# Patient Record
Sex: FEMALE | Race: WHITE | URBAN - NONMETROPOLITAN AREA
[De-identification: names, ages, dates, MRNs, and addresses within clinical notes are randomized per-mention and may not be internally consistent; named-entity substitution may affect disease eponyms.]

---

## 2019-03-16 ENCOUNTER — APPOINTMENT (OUTPATIENT)
Dept: ULTRASOUND IMAGING | Facility: OTHER | Age: 69
DRG: 419 | End: 2019-03-16
Attending: EMERGENCY MEDICINE
Payer: COMMERCIAL

## 2019-03-16 ENCOUNTER — HOSPITAL ENCOUNTER (INPATIENT)
Facility: OTHER | Age: 69
LOS: 1 days | Discharge: HOME | DRG: 419 | End: 2019-03-17
Attending: EMERGENCY MEDICINE | Admitting: SURGERY
Payer: COMMERCIAL

## 2019-03-16 ENCOUNTER — TRANSFERRED RECORDS (OUTPATIENT)
Dept: HEALTH INFORMATION MANAGEMENT | Facility: OTHER | Age: 69
End: 2019-03-16

## 2019-03-16 DIAGNOSIS — I10 ESSENTIAL HYPERTENSION, BENIGN: ICD-10-CM

## 2019-03-16 DIAGNOSIS — I48.20 CHRONIC ATRIAL FIBRILLATION (H): ICD-10-CM

## 2019-03-16 DIAGNOSIS — I48.91 ATRIAL FIBRILLATION, UNSPECIFIED TYPE (H): ICD-10-CM

## 2019-03-16 DIAGNOSIS — K81.0 ACUTE CHOLECYSTITIS: Primary | ICD-10-CM

## 2019-03-16 DIAGNOSIS — Z90.710 H/O: HYSTERECTOMY: ICD-10-CM

## 2019-03-16 DIAGNOSIS — K81.9 CHOLECYSTITIS: ICD-10-CM

## 2019-03-16 PROCEDURE — 99285 EMERGENCY DEPT VISIT HI MDM: CPT | Mod: 25 | Performed by: EMERGENCY MEDICINE

## 2019-03-16 PROCEDURE — 99223 1ST HOSP IP/OBS HIGH 75: CPT | Mod: AI | Performed by: SURGERY

## 2019-03-16 PROCEDURE — 25000132 ZZH RX MED GY IP 250 OP 250 PS 637: Performed by: SURGERY

## 2019-03-16 PROCEDURE — 25000128 H RX IP 250 OP 636: Performed by: EMERGENCY MEDICINE

## 2019-03-16 PROCEDURE — 96365 THER/PROPH/DIAG IV INF INIT: CPT | Performed by: EMERGENCY MEDICINE

## 2019-03-16 PROCEDURE — 99285 EMERGENCY DEPT VISIT HI MDM: CPT | Mod: Z6 | Performed by: EMERGENCY MEDICINE

## 2019-03-16 PROCEDURE — 25000128 H RX IP 250 OP 636: Performed by: SURGERY

## 2019-03-16 PROCEDURE — 76705 ECHO EXAM OF ABDOMEN: CPT

## 2019-03-16 PROCEDURE — 12000000 ZZH R&B MED SURG/OB

## 2019-03-16 RX ORDER — PROCHLORPERAZINE MALEATE 5 MG
5 TABLET ORAL EVERY 6 HOURS PRN
Status: DISCONTINUED | OUTPATIENT
Start: 2019-03-16 | End: 2019-03-17 | Stop reason: HOSPADM

## 2019-03-16 RX ORDER — FAMOTIDINE 20 MG/1
20 TABLET, FILM COATED ORAL 2 TIMES DAILY
COMMUNITY
Start: 2016-02-12

## 2019-03-16 RX ORDER — ATENOLOL 25 MG/1
25 TABLET ORAL DAILY
Status: DISCONTINUED | OUTPATIENT
Start: 2019-03-17 | End: 2019-03-17 | Stop reason: HOSPADM

## 2019-03-16 RX ORDER — ACETAMINOPHEN 325 MG/1
975 TABLET ORAL ONCE
Status: DISCONTINUED | OUTPATIENT
Start: 2019-03-16 | End: 2019-03-17 | Stop reason: HOSPADM

## 2019-03-16 RX ORDER — ATENOLOL 25 MG/1
25 TABLET ORAL DAILY
COMMUNITY
Start: 2019-02-14

## 2019-03-16 RX ORDER — NALOXONE HYDROCHLORIDE 0.4 MG/ML
.1-.4 INJECTION, SOLUTION INTRAMUSCULAR; INTRAVENOUS; SUBCUTANEOUS
Status: DISCONTINUED | OUTPATIENT
Start: 2019-03-16 | End: 2019-03-17

## 2019-03-16 RX ORDER — DIPHENHYDRAMINE HYDROCHLORIDE 50 MG/ML
25 INJECTION INTRAMUSCULAR; INTRAVENOUS EVERY 6 HOURS PRN
Status: DISCONTINUED | OUTPATIENT
Start: 2019-03-16 | End: 2019-03-17 | Stop reason: HOSPADM

## 2019-03-16 RX ORDER — CLINDAMYCIN PHOSPHATE 900 MG/50ML
900 INJECTION, SOLUTION INTRAVENOUS SEE ADMIN INSTRUCTIONS
Status: DISCONTINUED | OUTPATIENT
Start: 2019-03-16 | End: 2019-03-17 | Stop reason: HOSPADM

## 2019-03-16 RX ORDER — PHYTONADIONE 5 MG/1
5 TABLET ORAL ONCE
Status: COMPLETED | OUTPATIENT
Start: 2019-03-16 | End: 2019-03-16

## 2019-03-16 RX ORDER — HYDROMORPHONE HYDROCHLORIDE 1 MG/ML
.3-.5 INJECTION, SOLUTION INTRAMUSCULAR; INTRAVENOUS; SUBCUTANEOUS
Status: DISCONTINUED | OUTPATIENT
Start: 2019-03-16 | End: 2019-03-17 | Stop reason: HOSPADM

## 2019-03-16 RX ORDER — KETOROLAC TROMETHAMINE 15 MG/ML
15 INJECTION, SOLUTION INTRAMUSCULAR; INTRAVENOUS ONCE
Status: DISCONTINUED | OUTPATIENT
Start: 2019-03-16 | End: 2019-03-17 | Stop reason: HOSPADM

## 2019-03-16 RX ORDER — CLINDAMYCIN PHOSPHATE 900 MG/50ML
900 INJECTION, SOLUTION INTRAVENOUS
Status: DISCONTINUED | OUTPATIENT
Start: 2019-03-16 | End: 2019-03-17 | Stop reason: HOSPADM

## 2019-03-16 RX ORDER — CIPROFLOXACIN 2 MG/ML
400 INJECTION, SOLUTION INTRAVENOUS EVERY 12 HOURS
Status: DISCONTINUED | OUTPATIENT
Start: 2019-03-16 | End: 2019-03-17

## 2019-03-16 RX ORDER — PROCHLORPERAZINE 25 MG
12.5 SUPPOSITORY, RECTAL RECTAL EVERY 12 HOURS PRN
Status: DISCONTINUED | OUTPATIENT
Start: 2019-03-16 | End: 2019-03-17 | Stop reason: HOSPADM

## 2019-03-16 RX ORDER — SODIUM CHLORIDE 9 MG/ML
INJECTION, SOLUTION INTRAVENOUS CONTINUOUS
Status: DISCONTINUED | OUTPATIENT
Start: 2019-03-17 | End: 2019-03-17 | Stop reason: HOSPADM

## 2019-03-16 RX ADMIN — PHYTONADIONE 5 MG: 5 TABLET ORAL at 22:29

## 2019-03-16 RX ADMIN — CIPROFLOXACIN 400 MG: 2 INJECTION, SOLUTION INTRAVENOUS at 22:26

## 2019-03-16 RX ADMIN — METRONIDAZOLE 500 MG: 500 INJECTION, SOLUTION INTRAVENOUS at 19:25

## 2019-03-16 ASSESSMENT — ACTIVITIES OF DAILY LIVING (ADL)
TOILETING: 0-->INDEPENDENT
FALL_HISTORY_WITHIN_LAST_SIX_MONTHS: NO
SWALLOWING: 0-->SWALLOWS FOODS/LIQUIDS WITHOUT DIFFICULTY
RETIRED_COMMUNICATION: 0-->UNDERSTANDS/COMMUNICATES WITHOUT DIFFICULTY
COGNITION: 0 - NO COGNITION ISSUES REPORTED
DRESS: 0-->INDEPENDENT
BATHING: 0-->INDEPENDENT
TRANSFERRING: 0-->INDEPENDENT
RETIRED_EATING: 0-->INDEPENDENT
AMBULATION: 0-->INDEPENDENT

## 2019-03-16 ASSESSMENT — MIFFLIN-ST. JEOR
SCORE: 1187.21
SCORE: 1156.13

## 2019-03-16 NOTE — ED TRIAGE NOTES
Patient started experiencing abdominal pain around 1200 today. Patient brought into ER in [place],was diagnosed with gallbladder issues, and was sent to [facility] for further testing they cant offer at [facility] on the weekend. Patient arrived with 18g IV infusing normal saline. Patient got 0.5 Dilaudid IV and Cipro from [place] ER before transfer. Patient A/O, and resting comfortably in room. md in room to see patient. Report from Brandee Napier RN at .

## 2019-03-16 NOTE — ED PROVIDER NOTES
"Lauryn Raines  : 1950 Age: 69 year old Sex: female MRN: [id number]    CC: abdominal pain    HPI: Lauryn Raines is a 69 year old female with PMH significant for hypertension, atrial fibrillation, partial gastric outlet obstruction, hysterectomy who presents as a transfer from [place] with concern for acute cholecystitis.  She reports that the abdominal pain that started suddenly around 1 PM today is 10 out of 10, epigastric, associated with nausea and vomiting and comes in waves.  At the outside hospital he obtained a CT due to concern for potential adhesions and SBO and CT was concerning for cholecystitis.  She was transferred here because they do not have ultrasound nor a surgeon on call.  She endorses ongoing pain during the transfer here, states that her pain is currently well controlled.    ED Course and MDM:  Abdominal pain: In the right upper quadrant, labs already obtained from the outside hospital with essentially normal LFTs, very mildly elevated lipase, INR of 2.2, normal white blood cell count, lactate of 2.1.  I performed a bedside ultrasound which was concerning for a thickened gallbladder wall and some pericholecystic fluid as well as sludge in the gallbladder.  Based on this I ordered a formal ultrasound which was consistent with a diagnosis of cholecystitis.  She will be admitted to surgery with plans for cholecystectomy in the morning.  She is already received ciprofloxacin at the outside hospital, I will give her Flagyl here.  She will be n.p.o. at midnight.    Final Clinical Impression:  Cholecystitis    Aidan Stoner MD  Internal Medicine and Emergency Medicine  6:15 PM 19      Physical Exam:  /86   Pulse 66   Temp 97.6  F (36.4  C) (Tympanic)   Resp 16   Ht 1.6 m (5' 3\")   Wt 70.3 kg (155 lb)   BMI 27.46 kg/m      Gen: Awake, alert, currently comfortable  HEENT: MMM, EOMI  CV: Irregular no murmur  Pulm: Clear bilaterally  Abd: Soft, tender in RUQ with " positive murphys  Ext: Full ROM  Neuro: AOx3, no focal deficit  Psych: Appropriate affect, cognition intact    ROS:  10 point review of systems performed and negative except as noted in HPI.    Past Medical History:  HTN, afib    Family history:  History reviewed. No pertinent family history.    Social History:   Social History     Socioeconomic History     Marital status:      Spouse name: Not on file     Number of children: Not on file     Years of education: Not on file     Highest education level: Not on file   Occupational History     Not on file   Social Needs     Financial resource strain: Not on file     Food insecurity:     Worry: Not on file     Inability: Not on file     Transportation needs:     Medical: Not on file     Non-medical: Not on file   Tobacco Use     Smoking status: Never Smoker     Smokeless tobacco: Never Used   Substance and Sexual Activity     Alcohol use: Not on file     Drug use: Not on file     Sexual activity: Not on file   Lifestyle     Physical activity:     Days per week: Not on file     Minutes per session: Not on file     Stress: Not on file   Relationships     Social connections:     Talks on phone: Not on file     Gets together: Not on file     Attends Taoist service: Not on file     Active member of club or organization: Not on file     Attends meetings of clubs or organizations: Not on file     Relationship status: Not on file     Intimate partner violence:     Fear of current or ex partner: Not on file     Emotionally abused: Not on file     Physically abused: Not on file     Forced sexual activity: Not on file   Other Topics Concern     Not on file   Social History Narrative     Not on file         Surgical History:  hysterrectomy  Bladder prolapse repair             Aidan Stoner MD  03/16/19 1914

## 2019-03-17 ENCOUNTER — ANESTHESIA EVENT (OUTPATIENT)
Dept: SURGERY | Facility: OTHER | Age: 69
DRG: 419 | End: 2019-03-17
Payer: COMMERCIAL

## 2019-03-17 ENCOUNTER — ANESTHESIA (OUTPATIENT)
Dept: SURGERY | Facility: OTHER | Age: 69
DRG: 419 | End: 2019-03-17
Payer: COMMERCIAL

## 2019-03-17 VITALS
HEART RATE: 77 BPM | OXYGEN SATURATION: 92 % | HEIGHT: 63 IN | SYSTOLIC BLOOD PRESSURE: 131 MMHG | BODY MASS INDEX: 26.25 KG/M2 | RESPIRATION RATE: 16 BRPM | TEMPERATURE: 97.8 F | DIASTOLIC BLOOD PRESSURE: 78 MMHG | WEIGHT: 148.15 LBS

## 2019-03-17 PROBLEM — K81.0 ACUTE CHOLECYSTITIS: Status: ACTIVE | Noted: 2019-03-17

## 2019-03-17 LAB
ABO + RH BLD: NORMAL
ABO + RH BLD: NORMAL
ALBUMIN SERPL-MCNC: 3.2 G/DL (ref 3.5–5.7)
ALP SERPL-CCNC: 65 U/L (ref 34–104)
ALT SERPL W P-5'-P-CCNC: 11 U/L (ref 7–52)
ANION GAP SERPL CALCULATED.3IONS-SCNC: 7 MMOL/L (ref 3–14)
AST SERPL W P-5'-P-CCNC: 16 U/L (ref 13–39)
BILIRUB SERPL-MCNC: 0.7 MG/DL (ref 0.3–1)
BLD GP AB SCN SERPL QL: NORMAL
BLD PROD TYP BPU: NORMAL
BLD UNIT ID BPU: NORMAL
BLD UNIT ID BPU: NORMAL
BLOOD BANK CMNT PATIENT-IMP: NORMAL
BLOOD PRODUCT CODE: NORMAL
BLOOD PRODUCT CODE: NORMAL
BPU ID: NORMAL
BPU ID: NORMAL
BUN SERPL-MCNC: 10 MG/DL (ref 7–25)
CALCIUM SERPL-MCNC: 8.7 MG/DL (ref 8.6–10.3)
CHLORIDE SERPL-SCNC: 112 MMOL/L (ref 98–107)
CO2 SERPL-SCNC: 25 MMOL/L (ref 21–31)
CREAT SERPL-MCNC: 0.81 MG/DL (ref 0.6–1.2)
ERYTHROCYTE [DISTWIDTH] IN BLOOD BY AUTOMATED COUNT: 15.2 % (ref 10–15)
GFR SERPL CREATININE-BSD FRML MDRD: 70 ML/MIN/{1.73_M2}
GLUCOSE SERPL-MCNC: 95 MG/DL (ref 70–105)
HCT VFR BLD AUTO: 34 % (ref 35–47)
HGB BLD-MCNC: 10.8 G/DL (ref 11.7–15.7)
INR PPP: 1.54 (ref 0–1.3)
INR PPP: 2.12 (ref 0–1.3)
MCH RBC QN AUTO: 27.8 PG (ref 26.5–33)
MCHC RBC AUTO-ENTMCNC: 31.8 G/DL (ref 31.5–36.5)
MCV RBC AUTO: 88 FL (ref 78–100)
NUM BPU REQUESTED: 2
PLATELET # BLD AUTO: 136 10E9/L (ref 150–450)
POTASSIUM SERPL-SCNC: 3.5 MMOL/L (ref 3.5–5.1)
PROT SERPL-MCNC: 5.5 G/DL (ref 6.4–8.9)
RBC # BLD AUTO: 3.88 10E12/L (ref 3.8–5.2)
SODIUM SERPL-SCNC: 144 MMOL/L (ref 134–144)
SPECIMEN EXP DATE BLD: NORMAL
TRANSFUSION STATUS PATIENT QL: NORMAL
WBC # BLD AUTO: 6.3 10E9/L (ref 4–11)

## 2019-03-17 PROCEDURE — 86901 BLOOD TYPING SEROLOGIC RH(D): CPT | Performed by: SURGERY

## 2019-03-17 PROCEDURE — 25000125 ZZHC RX 250: Performed by: NURSE ANESTHETIST, CERTIFIED REGISTERED

## 2019-03-17 PROCEDURE — 25000564 ZZH DESFLURANE, EA 15 MIN: Performed by: SURGERY

## 2019-03-17 PROCEDURE — 30233K1 TRANSFUSION OF NONAUTOLOGOUS FROZEN PLASMA INTO PERIPHERAL VEIN, PERCUTANEOUS APPROACH: ICD-10-PCS | Performed by: SURGERY

## 2019-03-17 PROCEDURE — 25000132 ZZH RX MED GY IP 250 OP 250 PS 637: Performed by: SURGERY

## 2019-03-17 PROCEDURE — 47562 LAPAROSCOPIC CHOLECYSTECTOMY: CPT | Performed by: SURGERY

## 2019-03-17 PROCEDURE — 80053 COMPREHEN METABOLIC PANEL: CPT | Performed by: SURGERY

## 2019-03-17 PROCEDURE — 86850 RBC ANTIBODY SCREEN: CPT | Performed by: SURGERY

## 2019-03-17 PROCEDURE — 36000056 ZZH SURGERY LEVEL 3 1ST 30 MIN: Performed by: SURGERY

## 2019-03-17 PROCEDURE — 25000128 H RX IP 250 OP 636: Performed by: SURGERY

## 2019-03-17 PROCEDURE — 27210794 ZZH OR GENERAL SUPPLY STERILE: Performed by: SURGERY

## 2019-03-17 PROCEDURE — 99100 ANES PT EXTEME AGE<1 YR&>70: CPT | Performed by: NURSE ANESTHETIST, CERTIFIED REGISTERED

## 2019-03-17 PROCEDURE — 71000014 ZZH RECOVERY PHASE 1 LEVEL 2 FIRST HR: Performed by: SURGERY

## 2019-03-17 PROCEDURE — 37000008 ZZH ANESTHESIA TECHNICAL FEE, 1ST 30 MIN: Performed by: SURGERY

## 2019-03-17 PROCEDURE — 99140 ANES COMP EMERGENCY COND: CPT | Performed by: NURSE ANESTHETIST, CERTIFIED REGISTERED

## 2019-03-17 PROCEDURE — 85027 COMPLETE CBC AUTOMATED: CPT | Performed by: SURGERY

## 2019-03-17 PROCEDURE — 25000128 H RX IP 250 OP 636: Performed by: NURSE ANESTHETIST, CERTIFIED REGISTERED

## 2019-03-17 PROCEDURE — 85610 PROTHROMBIN TIME: CPT | Performed by: SURGERY

## 2019-03-17 PROCEDURE — 25000125 ZZHC RX 250: Performed by: SURGERY

## 2019-03-17 PROCEDURE — 25800030 ZZH RX IP 258 OP 636: Performed by: SURGERY

## 2019-03-17 PROCEDURE — 25800025 ZZH RX 258: Performed by: SURGERY

## 2019-03-17 PROCEDURE — P9059 PLASMA, FRZ BETWEEN 8-24HOUR: HCPCS | Performed by: SURGERY

## 2019-03-17 PROCEDURE — 25800030 ZZH RX IP 258 OP 636: Performed by: NURSE ANESTHETIST, CERTIFIED REGISTERED

## 2019-03-17 PROCEDURE — 0FT44ZZ RESECTION OF GALLBLADDER, PERCUTANEOUS ENDOSCOPIC APPROACH: ICD-10-PCS | Performed by: SURGERY

## 2019-03-17 PROCEDURE — 86900 BLOOD TYPING SEROLOGIC ABO: CPT | Performed by: SURGERY

## 2019-03-17 PROCEDURE — 37000009 ZZH ANESTHESIA TECHNICAL FEE, EACH ADDTL 15 MIN: Performed by: SURGERY

## 2019-03-17 PROCEDURE — 88304 TISSUE EXAM BY PATHOLOGIST: CPT

## 2019-03-17 PROCEDURE — 36415 COLL VENOUS BLD VENIPUNCTURE: CPT | Performed by: SURGERY

## 2019-03-17 PROCEDURE — 36000058 ZZH SURGERY LEVEL 3 EA 15 ADDTL MIN: Performed by: SURGERY

## 2019-03-17 PROCEDURE — 40000344 ZZHCL STATISTIC THAWING COMPONENT: Performed by: SURGERY

## 2019-03-17 PROCEDURE — 47562 LAPAROSCOPIC CHOLECYSTECTOMY: CPT | Performed by: NURSE ANESTHETIST, CERTIFIED REGISTERED

## 2019-03-17 RX ORDER — DEXAMETHASONE SODIUM PHOSPHATE 4 MG/ML
INJECTION, SOLUTION INTRA-ARTICULAR; INTRALESIONAL; INTRAMUSCULAR; INTRAVENOUS; SOFT TISSUE PRN
Status: DISCONTINUED | OUTPATIENT
Start: 2019-03-17 | End: 2019-03-17

## 2019-03-17 RX ORDER — LIDOCAINE 40 MG/G
CREAM TOPICAL
Status: DISCONTINUED | OUTPATIENT
Start: 2019-03-17 | End: 2019-03-17 | Stop reason: HOSPADM

## 2019-03-17 RX ORDER — SODIUM CHLORIDE, SODIUM LACTATE, POTASSIUM CHLORIDE, CALCIUM CHLORIDE 600; 310; 30; 20 MG/100ML; MG/100ML; MG/100ML; MG/100ML
INJECTION, SOLUTION INTRAVENOUS CONTINUOUS
Status: DISCONTINUED | OUTPATIENT
Start: 2019-03-17 | End: 2019-03-17 | Stop reason: HOSPADM

## 2019-03-17 RX ORDER — PROPOFOL 10 MG/ML
INJECTION, EMULSION INTRAVENOUS CONTINUOUS PRN
Status: DISCONTINUED | OUTPATIENT
Start: 2019-03-17 | End: 2019-03-17

## 2019-03-17 RX ORDER — SACCHAROMYCES BOULARDII 250 MG
250 CAPSULE ORAL DAILY
COMMUNITY

## 2019-03-17 RX ORDER — NEOSTIGMINE METHYLSULFATE 1 MG/ML
VIAL (ML) INJECTION PRN
Status: DISCONTINUED | OUTPATIENT
Start: 2019-03-17 | End: 2019-03-17

## 2019-03-17 RX ORDER — FENTANYL CITRATE 50 UG/ML
INJECTION, SOLUTION INTRAMUSCULAR; INTRAVENOUS PRN
Status: DISCONTINUED | OUTPATIENT
Start: 2019-03-17 | End: 2019-03-17

## 2019-03-17 RX ORDER — MULTIVIT-MIN/IRON/FOLIC ACID/K 18-600-40
1 CAPSULE ORAL DAILY
COMMUNITY

## 2019-03-17 RX ORDER — PROPOFOL 10 MG/ML
INJECTION, EMULSION INTRAVENOUS PRN
Status: DISCONTINUED | OUTPATIENT
Start: 2019-03-17 | End: 2019-03-17

## 2019-03-17 RX ORDER — GLYCOPYRROLATE 0.2 MG/ML
INJECTION, SOLUTION INTRAMUSCULAR; INTRAVENOUS PRN
Status: DISCONTINUED | OUTPATIENT
Start: 2019-03-17 | End: 2019-03-17

## 2019-03-17 RX ORDER — NALOXONE HYDROCHLORIDE 0.4 MG/ML
.1-.4 INJECTION, SOLUTION INTRAMUSCULAR; INTRAVENOUS; SUBCUTANEOUS
Status: DISCONTINUED | OUTPATIENT
Start: 2019-03-17 | End: 2019-03-17 | Stop reason: HOSPADM

## 2019-03-17 RX ORDER — ACETAMINOPHEN 500 MG
500 TABLET ORAL EVERY 6 HOURS PRN
COMMUNITY

## 2019-03-17 RX ORDER — BUPIVACAINE HYDROCHLORIDE AND EPINEPHRINE 5; 5 MG/ML; UG/ML
INJECTION, SOLUTION PERINEURAL PRN
Status: DISCONTINUED | OUTPATIENT
Start: 2019-03-17 | End: 2019-03-17 | Stop reason: HOSPADM

## 2019-03-17 RX ORDER — FENTANYL CITRATE 50 UG/ML
25-50 INJECTION, SOLUTION INTRAMUSCULAR; INTRAVENOUS
Status: DISCONTINUED | OUTPATIENT
Start: 2019-03-17 | End: 2019-03-17 | Stop reason: HOSPADM

## 2019-03-17 RX ORDER — SODIUM CHLORIDE, SODIUM LACTATE, POTASSIUM CHLORIDE, CALCIUM CHLORIDE 600; 310; 30; 20 MG/100ML; MG/100ML; MG/100ML; MG/100ML
INJECTION, SOLUTION INTRAVENOUS CONTINUOUS PRN
Status: DISCONTINUED | OUTPATIENT
Start: 2019-03-17 | End: 2019-03-17

## 2019-03-17 RX ORDER — ACETAMINOPHEN 10 MG/ML
INJECTION, SOLUTION INTRAVENOUS PRN
Status: DISCONTINUED | OUTPATIENT
Start: 2019-03-17 | End: 2019-03-17

## 2019-03-17 RX ORDER — WARFARIN SODIUM 4 MG/1
4 TABLET ORAL DAILY
COMMUNITY

## 2019-03-17 RX ORDER — KETAMINE HYDROCHLORIDE 50 MG/ML
INJECTION, SOLUTION INTRAMUSCULAR; INTRAVENOUS PRN
Status: DISCONTINUED | OUTPATIENT
Start: 2019-03-17 | End: 2019-03-17

## 2019-03-17 RX ORDER — LIDOCAINE HYDROCHLORIDE 20 MG/ML
INJECTION, SOLUTION INFILTRATION; PERINEURAL PRN
Status: DISCONTINUED | OUTPATIENT
Start: 2019-03-17 | End: 2019-03-17

## 2019-03-17 RX ADMIN — CLINDAMYCIN IN 5 PERCENT DEXTROSE 900 MG: 18 INJECTION, SOLUTION INTRAVENOUS at 11:14

## 2019-03-17 RX ADMIN — PROCHLORPERAZINE MALEATE 5 MG: 5 TABLET, FILM COATED ORAL at 10:26

## 2019-03-17 RX ADMIN — PHENYLEPHRINE HYDROCHLORIDE 200 MCG: 10 INJECTION, SOLUTION INTRAMUSCULAR; INTRAVENOUS; SUBCUTANEOUS at 11:42

## 2019-03-17 RX ADMIN — MIDAZOLAM 2 MG: 1 INJECTION INTRAMUSCULAR; INTRAVENOUS at 11:17

## 2019-03-17 RX ADMIN — DEXAMETHASONE SODIUM PHOSPHATE 4 MG: 4 INJECTION, SOLUTION INTRA-ARTICULAR; INTRALESIONAL; INTRAMUSCULAR; INTRAVENOUS; SOFT TISSUE at 11:24

## 2019-03-17 RX ADMIN — CIPROFLOXACIN 400 MG: 2 INJECTION, SOLUTION INTRAVENOUS at 04:16

## 2019-03-17 RX ADMIN — ATENOLOL 25 MG: 25 TABLET ORAL at 09:03

## 2019-03-17 RX ADMIN — PROPOFOL 150 MG: 10 INJECTION, EMULSION INTRAVENOUS at 11:18

## 2019-03-17 RX ADMIN — HYDROMORPHONE HYDROCHLORIDE 0.5 MG: 1 INJECTION, SOLUTION INTRAMUSCULAR; INTRAVENOUS; SUBCUTANEOUS at 13:53

## 2019-03-17 RX ADMIN — SODIUM CHLORIDE, POTASSIUM CHLORIDE, SODIUM LACTATE AND CALCIUM CHLORIDE: 600; 310; 30; 20 INJECTION, SOLUTION INTRAVENOUS at 12:24

## 2019-03-17 RX ADMIN — NEOSTIGMINE METHYLSULFATE 2 MG: 1 INJECTION INTRAVENOUS at 12:21

## 2019-03-17 RX ADMIN — LIDOCAINE HYDROCHLORIDE 80 MG: 20 INJECTION, SOLUTION INFILTRATION; PERINEURAL at 11:18

## 2019-03-17 RX ADMIN — PHENYLEPHRINE HYDROCHLORIDE 200 MCG: 10 INJECTION, SOLUTION INTRAMUSCULAR; INTRAVENOUS; SUBCUTANEOUS at 11:31

## 2019-03-17 RX ADMIN — PHENYLEPHRINE HYDROCHLORIDE 200 MCG: 10 INJECTION, SOLUTION INTRAMUSCULAR; INTRAVENOUS; SUBCUTANEOUS at 11:35

## 2019-03-17 RX ADMIN — PROPOFOL 100 MCG/KG/MIN: 10 INJECTION, EMULSION INTRAVENOUS at 11:19

## 2019-03-17 RX ADMIN — ROCURONIUM BROMIDE 30 MG: 10 INJECTION INTRAVENOUS at 11:18

## 2019-03-17 RX ADMIN — ACETAMINOPHEN 1000 MG: 10 INJECTION, SOLUTION INTRAVENOUS at 12:04

## 2019-03-17 RX ADMIN — SODIUM CHLORIDE, SODIUM LACTATE, POTASSIUM CHLORIDE, AND CALCIUM CHLORIDE: 600; 310; 30; 20 INJECTION, SOLUTION INTRAVENOUS at 13:22

## 2019-03-17 RX ADMIN — HYDROMORPHONE HYDROCHLORIDE 0.5 MG: 1 INJECTION, SOLUTION INTRAMUSCULAR; INTRAVENOUS; SUBCUTANEOUS at 09:08

## 2019-03-17 RX ADMIN — FENTANYL CITRATE 50 MCG: 50 INJECTION, SOLUTION INTRAMUSCULAR; INTRAVENOUS at 11:17

## 2019-03-17 RX ADMIN — SODIUM CHLORIDE: 9 INJECTION, SOLUTION INTRAVENOUS at 00:15

## 2019-03-17 RX ADMIN — GLYCOPYRROLATE 0.4 MG: 0.2 INJECTION, SOLUTION INTRAMUSCULAR; INTRAVENOUS at 12:21

## 2019-03-17 RX ADMIN — PHENYLEPHRINE HYDROCHLORIDE 200 MCG: 10 INJECTION, SOLUTION INTRAMUSCULAR; INTRAVENOUS; SUBCUTANEOUS at 11:49

## 2019-03-17 RX ADMIN — SODIUM CHLORIDE, POTASSIUM CHLORIDE, SODIUM LACTATE AND CALCIUM CHLORIDE: 600; 310; 30; 20 INJECTION, SOLUTION INTRAVENOUS at 11:14

## 2019-03-17 RX ADMIN — PROCHLORPERAZINE MALEATE 5 MG: 5 TABLET, FILM COATED ORAL at 15:08

## 2019-03-17 RX ADMIN — KETAMINE HYDROCHLORIDE 25 MG: 50 INJECTION, SOLUTION INTRAMUSCULAR; INTRAVENOUS at 11:19

## 2019-03-17 ASSESSMENT — ACTIVITIES OF DAILY LIVING (ADL)
ADLS_ACUITY_SCORE: 10

## 2019-03-17 ASSESSMENT — ENCOUNTER SYMPTOMS: DYSRHYTHMIAS: 1

## 2019-03-17 NOTE — OR NURSING
PACU Transfer Note    Lauryn Raines was transferred to Med/Surg via cart.  Equipment used for transport:  none.  Accompanied by:  RN x2  Prescriptions were: none present    PACU Respiratory Event Documentation     1) Episodes of Apnea greater than or equal to 10 seconds: no    2) Bradypnea - less than 8 breaths per minute: 14-16    3) Pain score on 0 to 10 scale: 0    4) Pain-sedation mismatch (yes or no): no    5) Repeated 02 desaturation less than 90% (yes or no): no    Anesthesia notified? (yes or no): yes    Any of the above events occuring repeatedly in separate 30 minute intervals may be considered recurrent PACU respiratory events.    Patient stable and meets phase 1 discharge criteria for transport from PACU.  Report given to Aminta prior to transport to the OR.  Marilin Liu RN on 3/17/2019 at 1:30 PM

## 2019-03-17 NOTE — DISCHARGE SUMMARY
[facility] Discharge Summary    Lauryn Raines MRN# [id number]   Age: 69 year old YOB: 1950     Date of Admission:  3/16/2019  Date of Discharge::  3/17/2019  5:12 PM  Admitting Physician:  Timo Bolanos MD  Discharge Physician:  Timo Bolanos MD             Admission Diagnoses:   Cholecystitis [K81.9]            Discharge Diagnosis:   cholecystitis          Procedures:   Procedure(s): Laparoscopic cholecystectomy        No procedures performed during this admission           Medications Prior to Admission:     Medications Prior to Admission   Medication Sig Dispense Refill Last Dose     acetaminophen (TYLENOL) 500 MG tablet Take 500 mg by mouth every 6 hours as needed for mild pain   Past Month at AM     atenolol (TENORMIN) 25 MG tablet Take 25 mg by mouth daily   3/16/2019 at AM     famotidine (PEPCID) 20 MG tablet Take 20 mg by mouth 2 times daily   3/16/2019 at AM     saccharomyces boulardii (FLORASTOR) 250 MG capsule Take 250 mg by mouth daily   3/16/2019 at AM     Vitamin D, Cholecalciferol, 1000 units TABS Take 1 tablet by mouth daily   3/16/2019 at AM     warfarin (COUMADIN) 4 MG tablet Take 4 mg by mouth daily   3/15/2019 at PM             Discharge Medications:     Current Discharge Medication List      CONTINUE these medications which have NOT CHANGED    Details   acetaminophen (TYLENOL) 500 MG tablet Take 500 mg by mouth every 6 hours as needed for mild pain      atenolol (TENORMIN) 25 MG tablet Take 25 mg by mouth daily      famotidine (PEPCID) 20 MG tablet Take 20 mg by mouth 2 times daily      saccharomyces boulardii (FLORASTOR) 250 MG capsule Take 250 mg by mouth daily      Vitamin D, Cholecalciferol, 1000 units TABS Take 1 tablet by mouth daily      warfarin (COUMADIN) 4 MG tablet Take 4 mg by mouth daily                   Consultations:   No consultations were requested during this admission          Brief History of Illness:   Reason for admission requiring a  surgical or invasive procedure:   cholecystitis   The patient underwent the following procedure(s):   Laparoscopic cholecystectomy    There were no immediate complications during this procedure.    Please refer to the full operative summary for details.             Hospital Course:   The patient was admitted to the hospital for acute cholecystitis.  She takes warfarin for atrial fibrillation and INR was elevated at the time of admission.  She was watched overnight given oral vitamin K in the evening and INR was rechecked in the morning.  In the morning INR still elevated at 2.1.  2 units of FFP were transfused and the INR was rechecked and was found to be 1.5.  At this point she was taken to the operating room and underwent laparoscopic cholecystectomy.  There were no complications or abnormalities with the surgery.  She was taken back to the floor and recovered for couple hours before being discharged to home in good condition.          Discharge Instructions and Follow-Up:   Discharge diet: Regular   Discharge activity: Lifting restricted to 15 pounds   Discharge follow-up: Follow up with Dr. Bolanos in 2 weeks   Wound care: May get incision wet in shower but do not soak or scrub           Discharge Disposition:   Discharged to home      Attestation:  I have reviewed today's vital signs, notes, medications, labs and imaging.  Amount of time performed on this discharge summary: 15 minutes.    Timo Bolanos MD

## 2019-03-17 NOTE — PROGRESS NOTES
GENERAL SURGERY PROGRESS NOTE  3/17/2019      Interval history:   No acute events overnight. Pain is moderately well controlled with current pain regimen. Patient tolerated some clears yesterday, NPO since midnight. She is ambulating about the room. Denies fevers, chills, chest pain or SOB.     Physical Exam:   General: laying in bed, appears comfortable  HEENT: no scleral icterus  Lungs: no increased work of breathing, lungs are CTAB  CV: irregular, no murmur   Abdomen: soft, non-distended, mild tenderness in the RUQ. Scars as described previously   Neuro: alert and oriented, normal speech and mentation     Labs are reviewed and are significant for K 3.5, Cr 0.81, Alb 3.2, T bili 0.7, ALP 65, WBC 6.3, Hgb 10.8, Plt 136, INR 2.12    No new imaging       Assessment / Plan:   Lauryn Raines is a 69F with acute cholecystitis. Surgery scheduled for this morning but INR remains elevated at 2.1.     - type/screen  - 2U FFP  - re-check INR after FFP is given  - to OR when INR ~1.5      OMAR Bolanos MD   3/17/2019

## 2019-03-17 NOTE — ANESTHESIA CARE TRANSFER NOTE
Patient: Lauryn Raines    Procedure(s):  LAPAROSCOPIC CHOLECYSTECTOMY    Diagnosis: cholecystitis  Diagnosis Additional Information: No value filed.    Anesthesia Type:   General, ETT     Note:  Airway :Face Mask  Patient transferred to:PACU  Handoff Report: Identifed the Patient, Identified the Reponsible Provider, Reviewed the pertinent medical history, Discussed the surgical course, Reviewed Intra-OP anesthesia mangement and issues during anesthesia, Set expectations for post-procedure period and Allowed opportunity for questions and acknowledgement of understanding      Vitals: (Last set prior to Anesthesia Care Transfer)    CRNA VITALS  3/17/2019 1215 - 3/17/2019 1247      3/17/2019             Resp Rate (set):  10                Electronically Signed By: PAUL WEST CRNA  March 17, 2019  12:47 PM

## 2019-03-17 NOTE — BRIEF OP NOTE
[facility] Brief Operative Note    Pre-operative diagnosis: cholecystitis   Post-operative diagnosis acute cholecystitis      Procedure: Procedure(s):  LAPAROSCOPIC CHOLECYSTECTOMY   Surgeon(s): Surgeon(s) and Role:     * Timo Bolanos MD - Primary   Estimated blood loss: 25 mL    Specimens: ID Type Source Tests Collected by Time Destination   A :  Tissue Gallbladder and Contents SURGICAL PATHOLOGY EXAM Timo Bolanos MD 3/17/2019 11:49 AM       Findings: Acute cholecystitis, cholelithiasis

## 2019-03-17 NOTE — H&P
GENERAL SURGERY CONSULTATION NOTE    Lauryn Raines   [address]  [place] [address]  69 year old  female    Primary Care Provider:  No Ref-Primary, Physician      HPI: Lauryn Raines presents to the ER with abdominal pain.  She was transferred from outside hospital for right upper quadrant ultrasound.  Patient noticed her symptoms started sometime around 1 PM today.  She ate lunch around 1130.  She notes the pain came on suddenly as acute, sharp periumbilical pain.  Since that time the pain has moved slightly superiorly and she knows she does have worsening pain in the right upper quadrant when palpated.  The pain got so bad that she did throw up.  She notices her emesis was pink.  She blames this on a Tums she took when she started to have an upset stomach.  She denies hematemesis.  She had a normal bowel movement this morning.  Soft brown stool.  She denies acholic stools she denies scleral icterus or jaundice.  Patient is never had pain like this in her life.  Patient denies family history of gallbladder problems.  Patient's surgical history is significant for appendectomy in the remote past and lysis of adhesions surgery for small bowel obstruction.  She is also had a bladder sling.  Patient has no history of peptic ulcer disease.  Patient has a history of atrial fibrillation for which she takes Coumadin.  She denies history of heart attack or stroke.  Patient denies problems the lungs.  She is a lifelong non-smoker.  Patient denies problems with her kidneys or history of diabetes.  Patient denies blood clots in legs or lungs.  She denies personal history of cancer.  Patient states she can easily achieve 4 metabolic equivalents of activity.    REVIEW OF SYSTEMS:    GENERAL: No fevers or chills. Denies fatigue, recent weight loss.  HEENT: No sinus drainage. No changes with vision or hearing. No difficulty swallowing.   LYMPHATICS:  No swollen nodes in axilla, neck or groin.  CARDIOVASCULAR: Denies chest pain,  palpitations and dyspnea on exertion.  PULMONARY: No shortness of breath or cough. No increase in sputum production.  GI: Denies melena, bright red blood in stools. No hematemesis. No constipation or diarrhea.  : No dysuria or hematuria.  SKIN: No recent rashes or ulcers.   HEMATOLOGY: Takes Coumadin for atrial fibrillation.  Last INR was recorded today was 2.2.  She last took Coumadin last night, 3/15/2019.  ENDOCRINE:  No history of diabetes or thyroid problems.  NEUROLOGY:  No history of seizures or headaches. No motor or sensory changes.        There is no problem list on file for this patient.      History reviewed. No pertinent past medical history.    History reviewed. No pertinent surgical history.    History reviewed. No pertinent family history.    Social History     Social History Narrative     Not on file       Social History     Socioeconomic History     Marital status:      Spouse name: Not on file     Number of children: Not on file     Years of education: Not on file     Highest education level: Not on file   Occupational History     Not on file   Social Needs     Financial resource strain: Not on file     Food insecurity:     Worry: Not on file     Inability: Not on file     Transportation needs:     Medical: Not on file     Non-medical: Not on file   Tobacco Use     Smoking status: Never Smoker     Smokeless tobacco: Never Used   Substance and Sexual Activity     Alcohol use: Not on file     Drug use: Not on file     Sexual activity: Not on file   Lifestyle     Physical activity:     Days per week: Not on file     Minutes per session: Not on file     Stress: Not on file   Relationships     Social connections:     Talks on phone: Not on file     Gets together: Not on file     Attends Hindu service: Not on file     Active member of club or organization: Not on file     Attends meetings of clubs or organizations: Not on file     Relationship status: Not on file     Intimate partner  "violence:     Fear of current or ex partner: Not on file     Emotionally abused: Not on file     Physically abused: Not on file     Forced sexual activity: Not on file   Other Topics Concern     Not on file   Social History Narrative     Not on file         No current facility-administered medications on file prior to encounter.   Current Outpatient Medications on File Prior to Encounter:  atenolol (TENORMIN) 25 MG tablet Take 25 mg by mouth daily   famotidine (PEPCID) 20 MG tablet Take 20 mg by mouth 2 times daily         ALLERGIES/SENSITIVITIES:   Allergies   Allergen Reactions     Pantoprazole Swelling     Amiodarone Other (See Comments)     Arms/tongue/legs \"burning\"     Ceftriaxone Hives     Chills     Codeine Nausea     Diltiazem      facial flushing, lump in throat     Enoxaparin      Chest tightness, \"funny feeling\" in throat  Facial flushing, lump in throat       Flecainide      Facial flushing, lump in throat     Heparin      Facial flushing; lump in throat     Metoclopramide      Pain in arms and back,worsened acid reflux     Metoprolol      Facial flushing and a lump in her throat after taking 2 daily doses     Morphine Hives     Omeprazole Itching and Other (See Comments)     Face hot flushed and eyes sore      Ondansetron Itching     Penicillins Hives     Chills     Pneumococcal 13-Bekah Conj Vacc      Skin turned red and felt like she had a lump in her throat     Ranitidine Other (See Comments)     Gets hot flushed, swollen eyes     Flu Virus Vaccine Swelling     Pt. Experienced swelling and redness of the arm     Hmg-Coa-R Inhibitors Other (See Comments)     Statin Intolerance Documentation  2. Shared decision making discussion with patient regarding statins and patient choses to not trial a second or additional statin.  per note on 10/10/16.       PHYSICAL EXAM:     /86   Pulse 66   Temp 97.6  F (36.4  C) (Tympanic)   Resp 16   Ht 1.6 m (5' 3\")   Wt 70.3 kg (155 lb)   BMI 27.46 kg/m  "     General Appearance:   Sitting up in the chair, no apparent distress  HEENT: Pupils are equal and reactive, no scleral icterus,   Heart & CV:  Irregular, no murmur.  Intact distal pulses, good cap refill.  LUNGS: No increased work of breathing.Lugns are CTA B/L, no wheezing or crackles.  Abd: Soft, nondistended, mild tenderness in the right upper quadrant, negative Carmona sign.  Scars consistent with given surgical history  Ext: Normal bulk and tone, no lower extremity edema  Neuro: Alert and oriented, normal speech and mentation    Labs from the outside hospital were reviewed.    Ultrasound images were reviewed while in the room with the ultrasound tech.  Ultrasound is significant for gallbladder wall thickening and pericholecystic fluid.  As well as cholelithiasis and gallbladder sludge.        CONSULTATION ASSESSMENT AND PLAN:    69 year old female with acute cholecystitis and cholelithiasis.  No evidence of choledocholithiasis.  Patient is on Coumadin for atrial fibrillation and INR is 2.2.  This will need to be reversed prior to surgery.  The technical details of laparoscopic cholecystectomy were discussed with the patient along with the risks and benefits to include bleeding, infection, bile leak, injury to surrounding structures including common bile duct, duodenum, stomach, colon, liver.  The patient demonstrated understanding and is willing to proceed.  She will be admitted overnight for pain control and cholecystectomy will be scheduled for the morning.      Timo Bolanos MD on 3/16/2019 at 7:16 PM

## 2019-03-17 NOTE — PHARMACY-ADMISSION MEDICATION HISTORY
Pharmacy -- Admission Medication Reconciliation    Prior to admission (PTA) medications were reviewed and the patient's PTA medication list was updated.    Sources Consulted: Patient, [facility] (closed)    The reliability of this Medication Reconciliation is: Reliability: Reliable    The following significant changes were made:    Added Warfarin 4 mg daily ([facility] [place] [facility]    Added Vitamin D 1000 units daily    Added probiotic daily    Added APAP as needed - patient RARELY uses    In addition, the patient's allergies were reviewed with the patient and updated as follows:   Allergies: Pantoprazole; Amiodarone; Ceftriaxone; Codeine; Diltiazem; Enoxaparin; Flecainide; Heparin; Metoclopramide; Metoprolol; Morphine; Omeprazole; Ondansetron; Penicillins; Pneumococcal 13-alana conj vacc; Ranitidine; Flu virus vaccine; and Hmg-coa-r inhibitors    The pharmacist has reviewed with the patient that all personal medications should be removed from the building or locked in the belongings safe.  Patient shall only take medications ordered by the physician and administered by the nursing staff.       Medication barriers identified: none   Medication adherence concerns: none   Understanding of emergency medications: ELA Lovell RPH, 3/17/2019,  8:55 AM

## 2019-03-17 NOTE — PROGRESS NOTES
Accessed patient chart to print AVS.  Jessica Angel RN.............................3/17/2019 3:33 PM

## 2019-03-17 NOTE — PLAN OF CARE
VSS. Pt denies abdominal pain but does have some mild abdominal tenderness to light palpation. She has been intermittently sleeping since admission to floor. New IV started due to infiltration to left ac iv site.     JAZZY DANIELS RN on 3/17/2019 at 5:50 AM

## 2019-03-17 NOTE — OP NOTE
PREOPERATIVE  DIAGNOSIS: Acute cholecystitis     POSTOPERATIVE DIAGNOSIS: Acute cholecystitis    PROCEDURE PERFORMED: Laparoscopic cholecystectomy    SURGEON:  Timo Bolanos MD    ASSISTANT: Circulator: Tanner Price RN  Scrub Person: Laina Norris  2nd Scrub: Danielle Espitia    ANESTHESIA: GeneralCRNA Independent: Rashmi Arenas APRN CRNA    FAMILYPHYSICIAN: No Ref-Primary, Physician      INDICATION FOR THE PROCEDURE:  The patient is a 69 year old y.o. female with upper abdominal pain presents the outside ER yesterday.  She was transferred here for surgical evaluation.  Ultrasound shows findings of acute cholecystitis.  She is booked for surgery.  Risks and benefits were discussed with the patient as outlined in the history and physical.    PROCEDURE:  Lauryn Raines was brought to the operating room placed in supine position.  General anesthetic was applied and the patient was intubated.  The abdomen was prepped and draped in usual sterile fashion.  Timeout was performed and everyone was in agreement to proceed.  Small incision in the left upper quadrant was made after injecting local anesthetic in the skin and subcutaneous tissues the abdominal wall was grasped manually and elevated the Veress needle was slowly advanced into the abdominal cavity.  After a positive drop test the abdomen was insufflated to 15 mmHg.  The 5 mm, 30 degree laparoscope was loaded into an Optiview trocar and slowly advanced into the abdominal cavity under direct vision.  The  was removed from the port and the underlying bowel and mesentery was inspected and there did not appear to be any injury to these structures.  There did look like there is a loop of bowel adherent to the anterior abdominal wall around the area of the umbilicus.  Upon palpation of the abdomen was noted to be slightly inferior to the umbilicus.  The supraumbilical port was deemed safe for placement in its normal location.  The patient had a  scar from the previous surgery in this area that cause a significant indentation of the skin.  This indentation was removed with an elliptical incision after injecting the skin and subcutaneous tissues with local anesthetic.  A 5 mm port was placed through this elliptical incision under direct vision.  The epigastric, subxiphoid port was placed in a similar fashion direct vision.  The camera was placed to the epigastric port and this loop of small bowel that is adherent to the anterior abdominal wall was carefully taken down with laparoscopic scissors.  When the loop had been taken down the did not appear to be any injury to the bowel wall, I did remove some peritoneum in order to safely get around the bowel.  The 2 right lateral 5 mm ports were placed under direct vision in a similar fashion to the subxiphoid port.  At this point the patient was positioned in reverse Trendelenburg position and rolled slightly to her left.  The fundus of the gallbladder was grasped and elevated toward the right shoulder.  There were some omental adhesions to the gallbladder that were taken down with electrocautery.  The infundibulum of the gallbladder was grasped and retracted laterally.  The peritoneum overlying Calot's triangle was scored both anteriorly and posteriorly.  This peritoneum and fatty tissues were swept proximally towards the common bile duct.  In fact, the common bile duct was visualized.  The cystic duct revealed itself at this point this was dissected circumferentially.  The cystic artery was identified with an close triangle however there is some overlying lymphatics or dense connective tissue that was in the way of complete dissection.  The cystic duct was clipped doubly proximally and singly distally and cut to reveal the cystic artery a little better.  Once we got better visualization of the artery the dissection was completed to isolate the cystic artery eventually.  The cystic artery was clipped doubly  proximally and singly distally and cut with laparoscopic scissors.  The connective tissue attachments to the liver were cut with electrocautery slowly taking her time so not to perforate the gallbladder.  Gallbladder was completely removed from the liver bed and placed in a laparoscopic retrieval bag.  The retrieval bag was pulled through the subxiphoid port and handed off the field.  The trocar was then replaced into this incision and the liver bed was again inspected for bleeding.  There did not appear to be any bleeding coming from the liver bed and the clips on the cystic artery and cystic duct appeared intact.  Approximately 250 mL of normal saline was used to irrigate the upper abdomen.  Returns were clear very quickly as there was very little bleeding during the case.  The lateral 5 mm ports were removed under direct vision a subxiphoid port was moved under direct vision and there were no bleeding from these areas.  The remaining two 5 mm ports were removed and the abdomen was completely desufflated.  One figure-of-eight stitch of 0 Vicryl was placed in the fascia of the subxiphoid port to prevent hernia in this area.  The skin was closed with 4-0 Monocryl.  The abdomen was cleansed and Dermabond was applied to the incisions.  At the end of the case of the sponge, needle, instrument counts were correct.  Patient tolerated procedure well.  She was extubated in the operating room and taken to recovery room in good condition.        OMAR Bolanos MD   3/17/2019

## 2019-03-17 NOTE — PROGRESS NOTES
Pt off unit for surgery with Day Surgery RN. Nurse to nurse report given at pt bedside. Chart and consent sent with pt. VSS. Pt reported that  ([facility]) is aware she will be going to surgery.

## 2019-03-17 NOTE — ANESTHESIA PREPROCEDURE EVALUATION
Anesthesia Pre-Procedure Evaluation    Patient: Lauryn Raines   MRN: [id number] : 1950          Preoperative Diagnosis: cholecystitis    Procedure(s):  LAPAROSCOPIC CHOLECYSTECTOMY    History reviewed. No pertinent past medical history.  History reviewed. No pertinent surgical history.    Anesthesia Evaluation     . Pt has had prior anesthetic.     No history of anesthetic complications          ROS/MED HX    ENT/Pulmonary:  - neg pulmonary ROS     Neurologic:  - neg neurologic ROS     Cardiovascular:     (+) ----. Taking blood thinners Pt has not received instructions: . . . :. dysrhythmias a-fib, .       METS/Exercise Tolerance:  >4 METS   Hematologic:  - neg hematologic  ROS       Musculoskeletal:  - neg musculoskeletal ROS       GI/Hepatic:     (+) GERD Asymptomatic on medication,       Renal/Genitourinary:  - ROS Renal section negative       Endo:  - neg endo ROS       Psychiatric:  - neg psychiatric ROS       Infectious Disease:  - neg infectious disease ROS       Malignancy:      - no malignancy   Other:    (+) No chance of pregnancy C-spine cleared: N/A, no H/O Chronic Pain,no other significant disability   - neg other ROS                      Physical Exam  Normal systems: cardiovascular, pulmonary and dental    Airway   Mallampati: II  TM distance: >3 FB  Neck ROM: full    Dental     Cardiovascular   Rhythm and rate: irregular and normal      Pulmonary    breath sounds clear to auscultation            Lab Results   Component Value Date    WBC 6.3 2019    HGB 10.8 (L) 2019    HCT 34.0 (L) 2019     (L) 2019     2019    POTASSIUM 3.5 2019    CHLORIDE 112 (H) 2019    CO2 25 2019    BUN 10 2019    CR 0.81 2019    GLC 95 2019    KEVIN 8.7 2019    ALBUMIN 3.2 (L) 2019    PROTTOTAL 5.5 (L) 2019    ALT 11 2019    AST 16 2019    ALKPHOS 65 2019    BILITOTAL 0.7 2019    INR 2.12 (H) 2019  "      Preop Vitals  BP Readings from Last 3 Encounters:   03/17/19 135/74    Pulse Readings from Last 3 Encounters:   03/17/19 71      Resp Readings from Last 3 Encounters:   03/17/19 16    SpO2 Readings from Last 3 Encounters:   03/17/19 94%      Temp Readings from Last 1 Encounters:   03/17/19 97.8  F (36.6  C) (Tympanic)    Ht Readings from Last 1 Encounters:   03/16/19 1.6 m (5' 3\")      Wt Readings from Last 1 Encounters:   03/16/19 67.2 kg (148 lb 2.4 oz)    Estimated body mass index is 26.24 kg/m  as calculated from the following:    Height as of this encounter: 1.6 m (5' 3\").    Weight as of this encounter: 67.2 kg (148 lb 2.4 oz).       Anesthesia Plan      History & Physical Review      ASA Status:  2 emergent.    NPO Status:  > 6 hours    Plan for General and ETT with Propofol induction. Maintenance will be Balanced.    PONV prophylaxis:  Dexamethasone or Solumedrol       Postoperative Care      Consents  Anesthetic plan, risks, benefits and alternatives discussed with:  Patient..                 PAUL WEST CRNA  "

## 2019-03-17 NOTE — ANESTHESIA POSTPROCEDURE EVALUATION
Patient: Lauryn Raines    Procedure(s):  LAPAROSCOPIC CHOLECYSTECTOMY    Diagnosis:cholecystitis  Diagnosis Additional Information: No value filed.    Anesthesia Type:  General, ETT    Note:  Anesthesia Post Evaluation    Patient location during evaluation: PACU  Patient participation: Able to fully participate in evaluation  Level of consciousness: awake and alert  Pain management: adequate  Airway patency: patent  Cardiovascular status: acceptable  Respiratory status: acceptable  Hydration status: acceptable  PONV: none     Anesthetic complications: None          Last vitals:  Vitals:    03/17/19 1315 03/17/19 1320 03/17/19 1325   BP: 142/83 143/85    Pulse: 73 75    Resp:      Temp:      SpO2: 92% 93% 92%         Electronically Signed By: PAUL WEST CRNA  March 17, 2019  1:26 PM

## 2019-03-17 NOTE — PLAN OF CARE
Pt post op lap patrick. VSS. Pt alert and orientated x4. Up to commode, spontaneously voiding. RA, lung sounds clear. Reported feel slightly nauseous post op, PRN compazine given. Reassessment pt denies nausea. Active bowel sounds. Afebrile. Rating pain 2-4/10, PRN dilaudid given x2. [facility] () at bedside and will be the pt's transport home.

## 2019-03-28 ENCOUNTER — OFFICE VISIT (OUTPATIENT)
Dept: SURGERY | Facility: OTHER | Age: 69
End: 2019-03-28
Attending: SURGERY
Payer: COMMERCIAL

## 2019-03-28 VITALS
HEART RATE: 80 BPM | TEMPERATURE: 97.4 F | SYSTOLIC BLOOD PRESSURE: 120 MMHG | WEIGHT: 149.2 LBS | DIASTOLIC BLOOD PRESSURE: 70 MMHG | HEIGHT: 63 IN | BODY MASS INDEX: 26.44 KG/M2

## 2019-03-28 DIAGNOSIS — Z98.890 POSTOPERATIVE STATE: Primary | ICD-10-CM

## 2019-03-28 PROBLEM — K81.0 ACUTE CHOLECYSTITIS: Status: RESOLVED | Noted: 2019-03-17 | Resolved: 2019-03-28

## 2019-03-28 PROBLEM — K81.9 CHOLECYSTITIS: Status: RESOLVED | Noted: 2019-03-16 | Resolved: 2019-03-28

## 2019-03-28 PROCEDURE — G0463 HOSPITAL OUTPT CLINIC VISIT: HCPCS

## 2019-03-28 PROCEDURE — 99024 POSTOP FOLLOW-UP VISIT: CPT | Performed by: SURGERY

## 2019-03-28 ASSESSMENT — MIFFLIN-ST. JEOR: SCORE: 1160.9

## 2019-03-28 ASSESSMENT — PAIN SCALES - GENERAL: PAINLEVEL: NO PAIN (0)

## 2019-03-28 NOTE — PROGRESS NOTES
"Lauryn Raines presents to clinic for follow-up after undergoing lap scopic cholecystectomy on 3/16/2019.  She is doing well.  She has only mild discomfort at the epigastric port site.  She is not taking any pain medications for this.  She is tolerating regular diet and bowel movements are normal.  She denies fevers chills, no chest pain or shortness of breath.    /70 (BP Location: Right arm, Patient Position: Sitting, Cuff Size: Adult Regular)   Pulse 80   Temp 97.4  F (36.3  C) (Temporal)   Ht 1.6 m (5' 3\")   Wt 67.7 kg (149 lb 3.2 oz)   Breastfeeding? No   BMI 26.43 kg/m      Incisions x4 are clean dry and intact, some mild ecchymosis inferior to the epigastric incision.  No signs of infection.    Lauryn Raines is a 69-year-old female status post laparoscopic cholecystectomy.  She is doing well.    No further follow-up is necessary  Patient was educated on the signs symptoms of wound infection.      Timo Bolanos MD  "

## 2019-03-28 NOTE — NURSING NOTE
"Chief Complaint   Patient presents with     Surgical Followup     Lap Hyun       Initial /70 (BP Location: Right arm, Patient Position: Sitting, Cuff Size: Adult Regular)   Pulse 80   Temp 97.4  F (36.3  C) (Temporal)   Ht 1.6 m (5' 3\")   Wt 67.7 kg (149 lb 3.2 oz)   Breastfeeding? No   BMI 26.43 kg/m   Estimated body mass index is 26.43 kg/m  as calculated from the following:    Height as of this encounter: 1.6 m (5' 3\").    Weight as of this encounter: 67.7 kg (149 lb 3.2 oz).  Medication Reconciliation: complete    Patsy Kc LPN  "

## 2019-03-28 NOTE — LETTER
[facility] [place] [facility]\""  [address]  [place] [address]  [phone]          March 28, 2019    RE:  Lauryn Raines                                                                                                                                                       [address] [facility] [place] [address]            To whom it may concern:    Lauryn Raines is under my professional care. She  may return to work immediately with no restrictions.       Sincerely,        Timo Bolanos MD

## 2019-06-16 ENCOUNTER — TRANSFERRED RECORDS (OUTPATIENT)
Dept: HEALTH INFORMATION MANAGEMENT | Facility: OTHER | Age: 69
End: 2019-06-16

## 2023-11-10 ENCOUNTER — HOSPITAL ENCOUNTER (OUTPATIENT)
Dept: MRI IMAGING | Facility: OTHER | Age: 73
Discharge: HOME | End: 2023-11-10
Attending: NURSE PRACTITIONER | Admitting: NURSE PRACTITIONER
Payer: COMMERCIAL

## 2023-11-10 DIAGNOSIS — R10.13 ABDOMINAL PAIN, EPIGASTRIC: ICD-10-CM

## 2023-11-10 DIAGNOSIS — Z90.49 STATUS POST LAPAROSCOPIC CHOLECYSTECTOMY: ICD-10-CM

## 2023-11-10 DIAGNOSIS — R19.8 CHANGE IN BOWEL MOVEMENT: ICD-10-CM

## 2023-11-10 PROCEDURE — A9575 INJ GADOTERATE MEGLUMI 0.1ML: HCPCS | Mod: JZ

## 2023-11-10 PROCEDURE — 255N000002 HC RX 255 OP 636: Mod: JZ

## 2023-11-10 PROCEDURE — 74183 MRI ABD W/O CNTR FLWD CNTR: CPT

## 2023-11-10 RX ORDER — GADOTERATE MEGLUMINE 376.9 MG/ML
15 INJECTION INTRAVENOUS ONCE
Status: COMPLETED | OUTPATIENT
Start: 2023-11-10 | End: 2023-11-10

## 2023-11-10 RX ADMIN — GADOTERATE MEGLUMINE 13 ML: 376.9 INJECTION INTRAVENOUS at 08:45

## (undated) DEVICE — PACK LAPAROSCOPY LF SBA15LPFCA

## (undated) DEVICE — ESU HOLDER LAP INST DISP PURPLE LONG 330MM H-PRO-330

## (undated) DEVICE — VERRES NEEDLE 120MM DISPOSABLE 12/BX

## (undated) DEVICE — ESU CORD MONOPOLAR 10'  E0510

## (undated) DEVICE — PREP CHLORAPREP 26ML TINTED ORANGE  260815

## (undated) DEVICE — ENDO TROCAR SLEEVE KII 5X100MM CTR03

## (undated) DEVICE — ENDO TROCAR FIRST ENTRY KII FIOS Z-THRD 12X100MM CTF73

## (undated) DEVICE — SOL WATER 1500ML

## (undated) DEVICE — ENDO TROCAR SLEEVE KII Z-THREADED 05X100MM CTS02

## (undated) DEVICE — SLEEVE COMPRESSION SCD KNEE MED 74022

## (undated) DEVICE — ESU GROUND PAD ADULT W/CORD E7507

## (undated) DEVICE — GLOVE BIOGEL PI INDICATOR 8.0 LF 41680

## (undated) DEVICE — ENDO POUCH UNIV RETRIEVAL SYSTEM INZII 10MM CD001

## (undated) DEVICE — DRSG MEDIPORE 2X2 3/4" 3562

## (undated) DEVICE — SU VICRYL 0 UR-6 27" J603H

## (undated) DEVICE — GLOVE PROTEXIS POWDER FREE SMT 7.5  2D72PT75X

## (undated) DEVICE — CLIP APPLIER ENDO ROTATING 10MM MED/LG ER320

## (undated) DEVICE — TUBING INSUFFLATOR W/FILTER OLYMPUS WA95005A

## (undated) DEVICE — SUCTION STRYKERFLOW II 250-070-500

## (undated) DEVICE — ADH LIQUID MASTISOL TOPICAL VIAL 2-3ML 0523-48

## (undated) DEVICE — SU MONOCRYL 4-0 PS-2 27" UND Y426H

## (undated) DEVICE — COVER LIGHT HANDLE LT-F02

## (undated) RX ORDER — FENTANYL CITRATE 50 UG/ML
INJECTION, SOLUTION INTRAMUSCULAR; INTRAVENOUS
Status: DISPENSED
Start: 2019-03-17

## (undated) RX ORDER — CIPROFLOXACIN 2 MG/ML
INJECTION, SOLUTION INTRAVENOUS
Status: DISPENSED
Start: 2019-03-16

## (undated) RX ORDER — PHENYLEPHRINE HCL IN 0.9% NACL 1 MG/10 ML
SYRINGE (ML) INTRAVENOUS
Status: DISPENSED
Start: 2019-03-17

## (undated) RX ORDER — CLINDAMYCIN PHOSPHATE 900 MG/50ML
INJECTION, SOLUTION INTRAVENOUS
Status: DISPENSED
Start: 2019-03-17

## (undated) RX ORDER — GLYCOPYRROLATE 0.2 MG/ML
INJECTION, SOLUTION INTRAMUSCULAR; INTRAVENOUS
Status: DISPENSED
Start: 2019-03-17

## (undated) RX ORDER — LIDOCAINE HYDROCHLORIDE 20 MG/ML
INJECTION, SOLUTION EPIDURAL; INFILTRATION; INTRACAUDAL; PERINEURAL
Status: DISPENSED
Start: 2019-03-17

## (undated) RX ORDER — ACETAMINOPHEN 10 MG/ML
INJECTION, SOLUTION INTRAVENOUS
Status: DISPENSED
Start: 2019-03-17

## (undated) RX ORDER — BUPIVACAINE HYDROCHLORIDE AND EPINEPHRINE 5; 5 MG/ML; UG/ML
INJECTION, SOLUTION EPIDURAL; INTRACAUDAL; PERINEURAL
Status: DISPENSED
Start: 2019-03-17

## (undated) RX ORDER — DEXAMETHASONE SODIUM PHOSPHATE 4 MG/ML
INJECTION, SOLUTION INTRA-ARTICULAR; INTRALESIONAL; INTRAMUSCULAR; INTRAVENOUS; SOFT TISSUE
Status: DISPENSED
Start: 2019-03-17

## (undated) RX ORDER — SODIUM CHLORIDE, SODIUM LACTATE, POTASSIUM CHLORIDE, CALCIUM CHLORIDE 600; 310; 30; 20 MG/100ML; MG/100ML; MG/100ML; MG/100ML
INJECTION, SOLUTION INTRAVENOUS
Status: DISPENSED
Start: 2019-03-17

## (undated) RX ORDER — PROPOFOL 10 MG/ML
INJECTION, EMULSION INTRAVENOUS
Status: DISPENSED
Start: 2019-03-17

## (undated) RX ORDER — NEOSTIGMINE METHYLSULFATE 0.5 MG/ML
INJECTION INTRAVENOUS
Status: DISPENSED
Start: 2019-03-17

## (undated) RX ORDER — KETAMINE HYDROCHLORIDE 50 MG/ML
INJECTION, SOLUTION INTRAMUSCULAR; INTRAVENOUS
Status: DISPENSED
Start: 2019-03-17